# Patient Record
Sex: FEMALE | Race: OTHER | Employment: UNEMPLOYED | URBAN - METROPOLITAN AREA
[De-identification: names, ages, dates, MRNs, and addresses within clinical notes are randomized per-mention and may not be internally consistent; named-entity substitution may affect disease eponyms.]

---

## 2018-11-19 ENCOUNTER — HOSPITAL ENCOUNTER (EMERGENCY)
Age: 43
Discharge: HOME | End: 2018-11-19
Attending: EMERGENCY MEDICINE
Payer: COMMERCIAL

## 2018-11-19 ENCOUNTER — APPOINTMENT (OUTPATIENT)
Dept: GENERAL RADIOLOGY | Age: 43
End: 2018-11-19
Payer: COMMERCIAL

## 2018-11-19 VITALS
OXYGEN SATURATION: 98 % | BODY MASS INDEX: 28.47 KG/M2 | HEART RATE: 88 BPM | HEIGHT: 60 IN | WEIGHT: 145 LBS | RESPIRATION RATE: 14 BRPM | SYSTOLIC BLOOD PRESSURE: 126 MMHG | TEMPERATURE: 98 F | DIASTOLIC BLOOD PRESSURE: 64 MMHG

## 2018-11-19 DIAGNOSIS — J18.9 PNEUMONIA OF RIGHT LOWER LOBE DUE TO INFECTIOUS ORGANISM: Primary | ICD-10-CM

## 2018-11-19 LAB
ANION GAP SERPL CALCULATED.3IONS-SCNC: 12 MMOL/L (ref 7–16)
BUN BLDV-MCNC: 11 MG/DL (ref 6–20)
CALCIUM SERPL-MCNC: 8.6 MG/DL (ref 8.6–10.2)
CHLORIDE BLD-SCNC: 103 MMOL/L (ref 98–107)
CO2: 22 MMOL/L (ref 22–29)
CREAT SERPL-MCNC: 0.6 MG/DL (ref 0.5–1)
D DIMER: 200 NG/ML DDU
EKG ATRIAL RATE: 92 BPM
EKG P AXIS: 72 DEGREES
EKG P-R INTERVAL: 146 MS
EKG Q-T INTERVAL: 380 MS
EKG QRS DURATION: 74 MS
EKG QTC CALCULATION (BAZETT): 469 MS
EKG R AXIS: 58 DEGREES
EKG T AXIS: 50 DEGREES
EKG VENTRICULAR RATE: 92 BPM
GFR AFRICAN AMERICAN: >60
GFR NON-AFRICAN AMERICAN: >60 ML/MIN/1.73
GLUCOSE BLD-MCNC: 91 MG/DL (ref 74–99)
HCT VFR BLD CALC: 38.6 % (ref 34–48)
HEMOGLOBIN: 12.3 G/DL (ref 11.5–15.5)
MCH RBC QN AUTO: 29.5 PG (ref 26–35)
MCHC RBC AUTO-ENTMCNC: 31.9 % (ref 32–34.5)
MCV RBC AUTO: 92.6 FL (ref 80–99.9)
PDW BLD-RTO: 13.3 FL (ref 11.5–15)
PLATELET # BLD: 360 E9/L (ref 130–450)
PMV BLD AUTO: 10.5 FL (ref 7–12)
POTASSIUM SERPL-SCNC: 5.2 MMOL/L (ref 3.5–5)
RBC # BLD: 4.17 E12/L (ref 3.5–5.5)
SODIUM BLD-SCNC: 137 MMOL/L (ref 132–146)
TROPONIN: <0.01 NG/ML (ref 0–0.03)
WBC # BLD: 10.1 E9/L (ref 4.5–11.5)

## 2018-11-19 PROCEDURE — 85378 FIBRIN DEGRADE SEMIQUANT: CPT

## 2018-11-19 PROCEDURE — 6370000000 HC RX 637 (ALT 250 FOR IP): Performed by: EMERGENCY MEDICINE

## 2018-11-19 PROCEDURE — 6370000000 HC RX 637 (ALT 250 FOR IP): Performed by: PHYSICIAN ASSISTANT

## 2018-11-19 PROCEDURE — 93005 ELECTROCARDIOGRAM TRACING: CPT

## 2018-11-19 PROCEDURE — 71046 X-RAY EXAM CHEST 2 VIEWS: CPT

## 2018-11-19 PROCEDURE — 99285 EMERGENCY DEPT VISIT HI MDM: CPT

## 2018-11-19 PROCEDURE — 85027 COMPLETE CBC AUTOMATED: CPT

## 2018-11-19 PROCEDURE — 80048 BASIC METABOLIC PNL TOTAL CA: CPT

## 2018-11-19 PROCEDURE — 84484 ASSAY OF TROPONIN QUANT: CPT

## 2018-11-19 PROCEDURE — 94640 AIRWAY INHALATION TREATMENT: CPT

## 2018-11-19 PROCEDURE — 94664 DEMO&/EVAL PT USE INHALER: CPT

## 2018-11-19 RX ORDER — IPRATROPIUM BROMIDE AND ALBUTEROL SULFATE 2.5; .5 MG/3ML; MG/3ML
1 SOLUTION RESPIRATORY (INHALATION)
Status: COMPLETED | OUTPATIENT
Start: 2018-11-19 | End: 2018-11-19

## 2018-11-19 RX ORDER — SODIUM CHLORIDE 0.9 % (FLUSH) 0.9 %
SYRINGE (ML) INJECTION
Status: DISCONTINUED
Start: 2018-11-19 | End: 2018-11-19 | Stop reason: HOSPADM

## 2018-11-19 RX ORDER — DOXYCYCLINE HYCLATE 100 MG/1
100 CAPSULE ORAL ONCE
Status: COMPLETED | OUTPATIENT
Start: 2018-11-19 | End: 2018-11-19

## 2018-11-19 RX ORDER — DOXYCYCLINE HYCLATE 100 MG
100 TABLET ORAL 2 TIMES DAILY
Qty: 14 TABLET | Refills: 0 | Status: SHIPPED | OUTPATIENT
Start: 2018-11-19 | End: 2018-11-26

## 2018-11-19 RX ORDER — CEFDINIR 300 MG/1
300 CAPSULE ORAL ONCE
Status: COMPLETED | OUTPATIENT
Start: 2018-11-19 | End: 2018-11-19

## 2018-11-19 RX ORDER — 0.9 % SODIUM CHLORIDE 0.9 %
10 VIAL (ML) INJECTION PRN
Status: DISCONTINUED | OUTPATIENT
Start: 2018-11-19 | End: 2018-11-19 | Stop reason: HOSPADM

## 2018-11-19 RX ORDER — CEFDINIR 300 MG/1
300 CAPSULE ORAL 2 TIMES DAILY
Qty: 14 CAPSULE | Refills: 0 | Status: SHIPPED | OUTPATIENT
Start: 2018-11-19 | End: 2018-11-26

## 2018-11-19 RX ADMIN — CEFDINIR 300 MG: 300 CAPSULE ORAL at 16:33

## 2018-11-19 RX ADMIN — IPRATROPIUM BROMIDE AND ALBUTEROL SULFATE 1 AMPULE: .5; 3 SOLUTION RESPIRATORY (INHALATION) at 12:53

## 2018-11-19 RX ADMIN — IPRATROPIUM BROMIDE AND ALBUTEROL SULFATE 1 AMPULE: .5; 3 SOLUTION RESPIRATORY (INHALATION) at 12:54

## 2018-11-19 RX ADMIN — DOXYCYCLINE HYCLATE 100 MG: 100 CAPSULE ORAL at 16:33

## 2018-11-19 NOTE — ED PROVIDER NOTES
380 ms    QTc Calculation (Bazett) 469 ms    P Axis 72 degrees    R Axis 58 degrees    T Axis 50 degrees       RADIOLOGY:  Interpreted by Radiologist.  XR CHEST STANDARD (2 VW)   Final Result   Nonspecific right basilar airspace disease could be   reflective of atelectasis or pneumonia, clinical correlation   recommended. EKG:  This EKG is signed and interpreted by the EP. Time: 1526  Rate: 92  Rhythm: normal sinus   Interpretation: no acute changes  Comparison: no previous EKG available    ------------------------- NURSING NOTES AND VITALS REVIEWED ---------------------------   The nursing notes within the ED encounter and vital signs as below have been reviewed by myself. /64   Pulse 88   Temp 98 °F (36.7 °C) (Oral)   Resp 14   Ht 5' (1.524 m)   Wt 145 lb (65.8 kg)   SpO2 98%   BMI 28.32 kg/m²   Oxygen Saturation Interpretation: Normal    The patients available past medical records and past encounters were reviewed. ------------------------------ ED COURSE/MEDICAL DECISION MAKING----------------------  Medications   sodium chloride (PF) 0.9 % injection 10 mL (not administered)   sodium chloride flush 0.9 % injection (not administered)   sodium chloride flush 0.9 % injection (not administered)   ipratropium-albuterol (DUONEB) nebulizer solution 1 ampule (1 ampule Inhalation Given 11/19/18 1254)   doxycycline hyclate (VIBRAMYCIN) capsule 100 mg (100 mg Oral Given 11/19/18 1633)   cefdinir (OMNICEF) capsule 300 mg (300 mg Oral Given 11/19/18 1633)       Medical Decision Making:    Patient presents with productive cough and URI symptoms. She was well-appearing on exam. Lungs were clear. No respiratory distress. Patient has no PE/DVT risk factors and a negative d-dimer. I do not suspect PE at this time. EKG showed NSR without ST or T wave changes. CXR concerning for pneumonia. Patient will be treated with doxycycline and omncief due to failure on Z-pack.  PCP referral and ED return

## 2018-11-30 ENCOUNTER — HOSPITAL ENCOUNTER (INPATIENT)
Age: 43
LOS: 2 days | Discharge: HOME | DRG: 145 | End: 2018-12-04
Attending: INTERNAL MEDICINE | Admitting: INTERNAL MEDICINE
Payer: COMMERCIAL

## 2018-11-30 ENCOUNTER — APPOINTMENT (OUTPATIENT)
Dept: GENERAL RADIOLOGY | Age: 43
DRG: 145 | End: 2018-11-30
Payer: COMMERCIAL

## 2018-11-30 DIAGNOSIS — J06.9 ACUTE UPPER RESPIRATORY INFECTION: ICD-10-CM

## 2018-11-30 DIAGNOSIS — J20.9 BRONCHITIS WITH BRONCHOSPASM: Primary | ICD-10-CM

## 2018-11-30 DIAGNOSIS — R06.00 DYSPNEA, UNSPECIFIED TYPE: ICD-10-CM

## 2018-11-30 LAB
ALBUMIN SERPL-MCNC: 4.1 G/DL (ref 3.5–5.2)
ALP BLD-CCNC: 84 U/L (ref 35–104)
ALT SERPL-CCNC: 15 U/L (ref 0–32)
ANION GAP SERPL CALCULATED.3IONS-SCNC: 11 MMOL/L (ref 7–16)
AST SERPL-CCNC: 13 U/L (ref 0–31)
B.E.: -3.7 MMOL/L (ref -3–3)
BASOPHILS ABSOLUTE: 0.03 E9/L (ref 0–0.2)
BASOPHILS RELATIVE PERCENT: 0.3 % (ref 0–2)
BILIRUB SERPL-MCNC: 0.3 MG/DL (ref 0–1.2)
BUN BLDV-MCNC: 8 MG/DL (ref 6–20)
CALCIUM SERPL-MCNC: 8.6 MG/DL (ref 8.6–10.2)
CHLORIDE BLD-SCNC: 106 MMOL/L (ref 98–107)
CO2: 24 MMOL/L (ref 22–29)
COHB: 0.6 % (ref 0–1.5)
CREAT SERPL-MCNC: 0.5 MG/DL (ref 0.5–1)
CRITICAL: ABNORMAL
DATE ANALYZED: ABNORMAL
DATE OF COLLECTION: ABNORMAL
EKG ATRIAL RATE: 87 BPM
EKG P AXIS: 42 DEGREES
EKG P-R INTERVAL: 146 MS
EKG Q-T INTERVAL: 370 MS
EKG QRS DURATION: 70 MS
EKG QTC CALCULATION (BAZETT): 445 MS
EKG R AXIS: 33 DEGREES
EKG T AXIS: 20 DEGREES
EKG VENTRICULAR RATE: 87 BPM
EOSINOPHILS ABSOLUTE: 0.52 E9/L (ref 0.05–0.5)
EOSINOPHILS RELATIVE PERCENT: 5 % (ref 0–6)
GFR AFRICAN AMERICAN: >60
GFR NON-AFRICAN AMERICAN: >60 ML/MIN/1.73
GLUCOSE BLD-MCNC: 91 MG/DL (ref 74–99)
HCO3: 20 MMOL/L (ref 22–26)
HCT VFR BLD CALC: 35.4 % (ref 34–48)
HEMOGLOBIN: 11.6 G/DL (ref 11.5–15.5)
HHB: 3.3 % (ref 0–5)
IMMATURE GRANULOCYTES #: 0.03 E9/L
IMMATURE GRANULOCYTES %: 0.3 % (ref 0–5)
LAB: ABNORMAL
LACTIC ACID: 1 MMOL/L (ref 0.5–2.2)
LYMPHOCYTES ABSOLUTE: 2.12 E9/L (ref 1.5–4)
LYMPHOCYTES RELATIVE PERCENT: 20.3 % (ref 20–42)
Lab: ABNORMAL
MCH RBC QN AUTO: 30 PG (ref 26–35)
MCHC RBC AUTO-ENTMCNC: 32.8 % (ref 32–34.5)
MCV RBC AUTO: 91.5 FL (ref 80–99.9)
METHB: 0.3 % (ref 0–1.5)
MODE: ABNORMAL
MONOCYTES ABSOLUTE: 0.7 E9/L (ref 0.1–0.95)
MONOCYTES RELATIVE PERCENT: 6.7 % (ref 2–12)
NEUTROPHILS ABSOLUTE: 7.06 E9/L (ref 1.8–7.3)
NEUTROPHILS RELATIVE PERCENT: 67.4 % (ref 43–80)
O2 CONTENT: 16.4 ML/DL
O2 SATURATION: 96.7 % (ref 92–98.5)
O2HB: 95.8 % (ref 94–97)
OPERATOR ID: ABNORMAL
PATIENT TEMP: 37 C
PCO2: 31.7 MMHG (ref 35–45)
PDW BLD-RTO: 13.2 FL (ref 11.5–15)
PH BLOOD GAS: 7.42 (ref 7.35–7.45)
PLATELET # BLD: 326 E9/L (ref 130–450)
PMV BLD AUTO: 10.3 FL (ref 7–12)
PO2: 87.3 MMHG (ref 60–100)
POTASSIUM SERPL-SCNC: 3.9 MMOL/L (ref 3.5–5)
RBC # BLD: 3.87 E12/L (ref 3.5–5.5)
SODIUM BLD-SCNC: 141 MMOL/L (ref 132–146)
SOURCE, BLOOD GAS: ABNORMAL
THB: 12.1 G/DL (ref 11.5–16.5)
TIME ANALYZED: 1604
TOTAL PROTEIN: 7 G/DL (ref 6.4–8.3)
WBC # BLD: 10.5 E9/L (ref 4.5–11.5)

## 2018-11-30 PROCEDURE — 93005 ELECTROCARDIOGRAM TRACING: CPT | Performed by: PHYSICIAN ASSISTANT

## 2018-11-30 PROCEDURE — 6360000002 HC RX W HCPCS: Performed by: INTERNAL MEDICINE

## 2018-11-30 PROCEDURE — 99285 EMERGENCY DEPT VISIT HI MDM: CPT

## 2018-11-30 PROCEDURE — 85025 COMPLETE CBC W/AUTO DIFF WBC: CPT

## 2018-11-30 PROCEDURE — 2580000003 HC RX 258: Performed by: INTERNAL MEDICINE

## 2018-11-30 PROCEDURE — 6360000002 HC RX W HCPCS: Performed by: PHYSICIAN ASSISTANT

## 2018-11-30 PROCEDURE — 83605 ASSAY OF LACTIC ACID: CPT

## 2018-11-30 PROCEDURE — 94664 DEMO&/EVAL PT USE INHALER: CPT

## 2018-11-30 PROCEDURE — 96376 TX/PRO/DX INJ SAME DRUG ADON: CPT

## 2018-11-30 PROCEDURE — G0378 HOSPITAL OBSERVATION PER HR: HCPCS

## 2018-11-30 PROCEDURE — 96374 THER/PROPH/DIAG INJ IV PUSH: CPT

## 2018-11-30 PROCEDURE — 80053 COMPREHEN METABOLIC PANEL: CPT

## 2018-11-30 PROCEDURE — 94761 N-INVAS EAR/PLS OXIMETRY MLT: CPT

## 2018-11-30 PROCEDURE — 94640 AIRWAY INHALATION TREATMENT: CPT

## 2018-11-30 PROCEDURE — 87040 BLOOD CULTURE FOR BACTERIA: CPT

## 2018-11-30 PROCEDURE — 82805 BLOOD GASES W/O2 SATURATION: CPT

## 2018-11-30 PROCEDURE — 6370000000 HC RX 637 (ALT 250 FOR IP): Performed by: PHYSICIAN ASSISTANT

## 2018-11-30 PROCEDURE — 2580000003 HC RX 258: Performed by: PHYSICIAN ASSISTANT

## 2018-11-30 PROCEDURE — 71046 X-RAY EXAM CHEST 2 VIEWS: CPT

## 2018-11-30 RX ORDER — METHYLPREDNISOLONE SODIUM SUCCINATE 125 MG/2ML
60 INJECTION, POWDER, LYOPHILIZED, FOR SOLUTION INTRAMUSCULAR; INTRAVENOUS EVERY 6 HOURS
Status: DISCONTINUED | OUTPATIENT
Start: 2018-11-30 | End: 2018-12-01

## 2018-11-30 RX ORDER — 0.9 % SODIUM CHLORIDE 0.9 %
1000 INTRAVENOUS SOLUTION INTRAVENOUS ONCE
Status: COMPLETED | OUTPATIENT
Start: 2018-11-30 | End: 2018-11-30

## 2018-11-30 RX ORDER — SODIUM CHLORIDE 0.9 % (FLUSH) 0.9 %
10 SYRINGE (ML) INJECTION EVERY 12 HOURS SCHEDULED
Status: DISCONTINUED | OUTPATIENT
Start: 2018-11-30 | End: 2018-12-04 | Stop reason: HOSPADM

## 2018-11-30 RX ORDER — IPRATROPIUM BROMIDE AND ALBUTEROL SULFATE 2.5; .5 MG/3ML; MG/3ML
3 SOLUTION RESPIRATORY (INHALATION) ONCE
Status: COMPLETED | OUTPATIENT
Start: 2018-11-30 | End: 2018-11-30

## 2018-11-30 RX ORDER — ONDANSETRON 2 MG/ML
4 INJECTION INTRAMUSCULAR; INTRAVENOUS EVERY 6 HOURS PRN
Status: DISCONTINUED | OUTPATIENT
Start: 2018-11-30 | End: 2018-12-04 | Stop reason: HOSPADM

## 2018-11-30 RX ORDER — METHYLPREDNISOLONE SODIUM SUCCINATE 125 MG/2ML
125 INJECTION, POWDER, LYOPHILIZED, FOR SOLUTION INTRAMUSCULAR; INTRAVENOUS ONCE
Status: COMPLETED | OUTPATIENT
Start: 2018-11-30 | End: 2018-11-30

## 2018-11-30 RX ORDER — SODIUM CHLORIDE 0.9 % (FLUSH) 0.9 %
10 SYRINGE (ML) INJECTION PRN
Status: DISCONTINUED | OUTPATIENT
Start: 2018-11-30 | End: 2018-12-04 | Stop reason: HOSPADM

## 2018-11-30 RX ORDER — ALBUTEROL SULFATE 2.5 MG/3ML
2.5 SOLUTION RESPIRATORY (INHALATION)
Status: DISCONTINUED | OUTPATIENT
Start: 2018-11-30 | End: 2018-12-04 | Stop reason: HOSPADM

## 2018-11-30 RX ADMIN — IPRATROPIUM BROMIDE AND ALBUTEROL SULFATE 3 AMPULE: .5; 3 SOLUTION RESPIRATORY (INHALATION) at 14:09

## 2018-11-30 RX ADMIN — ALBUTEROL SULFATE 2.5 MG: 2.5 SOLUTION RESPIRATORY (INHALATION) at 21:16

## 2018-11-30 RX ADMIN — SODIUM CHLORIDE 1000 ML: 9 INJECTION, SOLUTION INTRAVENOUS at 14:00

## 2018-11-30 RX ADMIN — METHYLPREDNISOLONE SODIUM SUCCINATE 125 MG: 125 INJECTION, POWDER, FOR SOLUTION INTRAMUSCULAR; INTRAVENOUS at 14:01

## 2018-11-30 RX ADMIN — METHYLPREDNISOLONE SODIUM SUCCINATE 60 MG: 125 INJECTION, POWDER, LYOPHILIZED, FOR SOLUTION INTRAMUSCULAR; INTRAVENOUS at 20:20

## 2018-11-30 RX ADMIN — Medication 10 ML: at 20:20

## 2018-11-30 ASSESSMENT — PAIN SCALES - GENERAL
PAINLEVEL_OUTOF10: 0
PAINLEVEL_OUTOF10: 0

## 2018-11-30 NOTE — ED PROVIDER NOTES
meningeal signs  Pulmonary: Lungs decreased with bilateral expiratory wheezing noted. No rales, or rhonchi. Not in respiratory distress  Cardiovascular:  Regular rate and rhythm, no murmurs, gallops, or rubs. 2+ distal pulses  Abdomen: Soft, + BS. No distension. Nontender. No palpable rigidity, rebound or guarding  Extremities: Moves all extremities x 4. Warm and well perfused  Skin: warm and dry without rash  Neurologic: GCS 15,  Intact. No focal deficits  Psych: Normal Affect      ------------------------ ED COURSE/MEDICAL DECISION MAKING----------------------  Medications   0.9 % sodium chloride bolus (1,000 mLs Intravenous New Bag 11/30/18 1400)   ipratropium-albuterol (DUONEB) nebulizer solution 3 ampule (3 ampules Inhalation Given 11/30/18 1409)   methylPREDNISolone sodium (SOLU-MEDROL) injection 125 mg (125 mg Intravenous Given 11/30/18 1401)         Medical Decision Making:    Dr. Yolanda Rodriguez has already called him about this patient and would like her admitted pending further workup. We did give her oral DuoNeb treatment and Solu-Medrol. Patient will be seen and evaluated with Dr. Georgina Rasmussen       Counseling: The emergency provider has spoken with the patient and discussed todays results, in addition to providing specific details for the plan of care and counseling regarding the diagnosis and prognosis. Questions are answered at this time and they are agreeable with the plan.      ------------------------ IMPRESSION AND DISPOSITION -------------------------------    IMPRESSION  1. Bronchitis with bronchospasm    2. Dyspnea, unspecified type    3.  Acute upper respiratory infection        DISPOSITION  Disposition: Admit/Observation to [facility]  Patient condition is stable                   Theo Johnson PA-C  11/30/18 8587

## 2018-11-30 NOTE — ED NOTES
Patient was seen here last week and dx with pneumonia. She finished prescriptions of doxycycline and cefdinir this past Monday 11/26. She went into see Dr. Barbara Shah today and was told to come in here. Pt states she does not feel much better. She is still having shortness of breath, cough, and wheezing. Denies fevers or chills.       Félix Hickey RN  11/30/18 0009

## 2018-11-30 NOTE — ED NOTES
Bed:  [place] [address]  Expected date:   Expected time:   Means of arrival:   Comments:  Phoenix Arcos RN  11/30/18 4842

## 2018-12-01 ENCOUNTER — APPOINTMENT (OUTPATIENT)
Dept: CT IMAGING | Age: 43
DRG: 145 | End: 2018-12-01
Payer: COMMERCIAL

## 2018-12-01 LAB
BASOPHILS ABSOLUTE: 0.01 E9/L (ref 0–0.2)
BASOPHILS RELATIVE PERCENT: 0.1 % (ref 0–2)
D DIMER: 346 NG/ML DDU
EOSINOPHILS ABSOLUTE: 0 E9/L (ref 0.05–0.5)
EOSINOPHILS RELATIVE PERCENT: 0 % (ref 0–6)
FILM ARRAY ADENOVIRUS: NORMAL
FILM ARRAY BORDETELLA PERTUSSIS: NORMAL
FILM ARRAY CHLAMYDOPHILIA PNEUMONIAE: NORMAL
FILM ARRAY CORONAVIRUS 229E: NORMAL
FILM ARRAY CORONAVIRUS HKU1: NORMAL
FILM ARRAY CORONAVIRUS NL63: NORMAL
FILM ARRAY CORONAVIRUS OC43: NORMAL
FILM ARRAY INFLUENZA A VIRUS 09H1: NORMAL
FILM ARRAY INFLUENZA A VIRUS H1: NORMAL
FILM ARRAY INFLUENZA A VIRUS H3: NORMAL
FILM ARRAY INFLUENZA A VIRUS: NORMAL
FILM ARRAY INFLUENZA B: NORMAL
FILM ARRAY METAPNEUMOVIRUS: NORMAL
FILM ARRAY MYCOPLASMA PNEUMONIAE: NORMAL
FILM ARRAY PARAINFLUENZA VIRUS 1: NORMAL
FILM ARRAY PARAINFLUENZA VIRUS 2: NORMAL
FILM ARRAY PARAINFLUENZA VIRUS 3: NORMAL
FILM ARRAY PARAINFLUENZA VIRUS 4: NORMAL
FILM ARRAY RESPIRATORY SYNCITIAL VIRUS: NORMAL
FILM ARRAY RHINOVIRUS/ENTEROVIRUS: NORMAL
HCG QUALITATIVE: NEGATIVE
HCT VFR BLD CALC: 35.5 % (ref 34–48)
HEMOGLOBIN: 11.7 G/DL (ref 11.5–15.5)
IMMATURE GRANULOCYTES #: 0.04 E9/L
IMMATURE GRANULOCYTES %: 0.4 % (ref 0–5)
LV EF: 65 %
LVEF MODALITY: NORMAL
LYMPHOCYTES ABSOLUTE: 1.02 E9/L (ref 1.5–4)
LYMPHOCYTES RELATIVE PERCENT: 10.1 % (ref 20–42)
MCH RBC QN AUTO: 29.8 PG (ref 26–35)
MCHC RBC AUTO-ENTMCNC: 33 % (ref 32–34.5)
MCV RBC AUTO: 90.6 FL (ref 80–99.9)
MONOCYTES ABSOLUTE: 0.03 E9/L (ref 0.1–0.95)
MONOCYTES RELATIVE PERCENT: 0.3 % (ref 2–12)
NEUTROPHILS ABSOLUTE: 8.96 E9/L (ref 1.8–7.3)
NEUTROPHILS RELATIVE PERCENT: 89.1 % (ref 43–80)
PDW BLD-RTO: 13.1 FL (ref 11.5–15)
PLATELET # BLD: 349 E9/L (ref 130–450)
PMV BLD AUTO: 10.2 FL (ref 7–12)
PROCALCITONIN: 0.02 NG/ML (ref 0–0.08)
RBC # BLD: 3.92 E12/L (ref 3.5–5.5)
T3 TOTAL: 87.3 NG/DL (ref 80–200)
T4 TOTAL: 7.4 MCG/DL (ref 4.5–11.7)
TSH SERPL DL<=0.05 MIU/L-ACNC: 0.76 UIU/ML (ref 0.27–4.2)
WBC # BLD: 10.1 E9/L (ref 4.5–11.5)

## 2018-12-01 PROCEDURE — 71275 CT ANGIOGRAPHY CHEST: CPT

## 2018-12-01 PROCEDURE — 36415 COLL VENOUS BLD VENIPUNCTURE: CPT

## 2018-12-01 PROCEDURE — 87798 DETECT AGENT NOS DNA AMP: CPT

## 2018-12-01 PROCEDURE — 84443 ASSAY THYROID STIM HORMONE: CPT

## 2018-12-01 PROCEDURE — 96372 THER/PROPH/DIAG INJ SC/IM: CPT

## 2018-12-01 PROCEDURE — 2580000003 HC RX 258

## 2018-12-01 PROCEDURE — 96376 TX/PRO/DX INJ SAME DRUG ADON: CPT

## 2018-12-01 PROCEDURE — 84703 CHORIONIC GONADOTROPIN ASSAY: CPT

## 2018-12-01 PROCEDURE — 87486 CHLMYD PNEUM DNA AMP PROBE: CPT

## 2018-12-01 PROCEDURE — 94640 AIRWAY INHALATION TREATMENT: CPT

## 2018-12-01 PROCEDURE — 85378 FIBRIN DEGRADE SEMIQUANT: CPT

## 2018-12-01 PROCEDURE — 6360000002 HC RX W HCPCS: Performed by: INTERNAL MEDICINE

## 2018-12-01 PROCEDURE — 94760 N-INVAS EAR/PLS OXIMETRY 1: CPT

## 2018-12-01 PROCEDURE — 93306 TTE W/DOPPLER COMPLETE: CPT

## 2018-12-01 PROCEDURE — 84480 ASSAY TRIIODOTHYRONINE (T3): CPT

## 2018-12-01 PROCEDURE — 87450 HC DIRECT STREP B ANTIGEN: CPT

## 2018-12-01 PROCEDURE — 85025 COMPLETE CBC W/AUTO DIFF WBC: CPT

## 2018-12-01 PROCEDURE — 2580000003 HC RX 258: Performed by: INTERNAL MEDICINE

## 2018-12-01 PROCEDURE — 87633 RESP VIRUS 12-25 TARGETS: CPT

## 2018-12-01 PROCEDURE — 84145 PROCALCITONIN (PCT): CPT

## 2018-12-01 PROCEDURE — 87581 M.PNEUMON DNA AMP PROBE: CPT

## 2018-12-01 PROCEDURE — 6360000004 HC RX CONTRAST MEDICATION: Performed by: RADIOLOGY

## 2018-12-01 PROCEDURE — G0378 HOSPITAL OBSERVATION PER HR: HCPCS

## 2018-12-01 PROCEDURE — 84436 ASSAY OF TOTAL THYROXINE: CPT

## 2018-12-01 RX ORDER — METHYLPREDNISOLONE SODIUM SUCCINATE 40 MG/ML
40 INJECTION, POWDER, LYOPHILIZED, FOR SOLUTION INTRAMUSCULAR; INTRAVENOUS EVERY 8 HOURS
Status: DISCONTINUED | OUTPATIENT
Start: 2018-12-01 | End: 2018-12-02

## 2018-12-01 RX ADMIN — Medication 10 ML: at 21:18

## 2018-12-01 RX ADMIN — ENOXAPARIN SODIUM 40 MG: 40 INJECTION SUBCUTANEOUS at 08:47

## 2018-12-01 RX ADMIN — ALBUTEROL SULFATE 2.5 MG: 2.5 SOLUTION RESPIRATORY (INHALATION) at 16:23

## 2018-12-01 RX ADMIN — Medication 10 ML: at 08:51

## 2018-12-01 RX ADMIN — Medication 10 ML: at 03:00

## 2018-12-01 RX ADMIN — ALBUTEROL SULFATE 2.5 MG: 2.5 SOLUTION RESPIRATORY (INHALATION) at 20:24

## 2018-12-01 RX ADMIN — WATER 2 ML: 1 INJECTION INTRAMUSCULAR; INTRAVENOUS; SUBCUTANEOUS at 08:51

## 2018-12-01 RX ADMIN — ALBUTEROL SULFATE 2.5 MG: 2.5 SOLUTION RESPIRATORY (INHALATION) at 08:18

## 2018-12-01 RX ADMIN — METHYLPREDNISOLONE SODIUM SUCCINATE 60 MG: 125 INJECTION, POWDER, LYOPHILIZED, FOR SOLUTION INTRAMUSCULAR; INTRAVENOUS at 03:00

## 2018-12-01 RX ADMIN — METHYLPREDNISOLONE SODIUM SUCCINATE 40 MG: 40 INJECTION, POWDER, FOR SOLUTION INTRAMUSCULAR; INTRAVENOUS at 17:26

## 2018-12-01 RX ADMIN — ALBUTEROL SULFATE 2.5 MG: 2.5 SOLUTION RESPIRATORY (INHALATION) at 11:56

## 2018-12-01 RX ADMIN — METHYLPREDNISOLONE SODIUM SUCCINATE 60 MG: 125 INJECTION, POWDER, LYOPHILIZED, FOR SOLUTION INTRAMUSCULAR; INTRAVENOUS at 08:50

## 2018-12-01 RX ADMIN — IOPAMIDOL 100 ML: 755 INJECTION, SOLUTION INTRAVENOUS at 12:29

## 2018-12-01 ASSESSMENT — PAIN SCALES - GENERAL
PAINLEVEL_OUTOF10: 0
PAINLEVEL_OUTOF10: 0

## 2018-12-02 PROBLEM — R06.00 DYSPNEA: Status: ACTIVE | Noted: 2018-12-02

## 2018-12-02 LAB
CORTISOL TOTAL: 6.72 MCG/DL (ref 2.68–18.4)
STREP PNEUMONIAE ANTIGEN, URINE: NORMAL

## 2018-12-02 PROCEDURE — 94640 AIRWAY INHALATION TREATMENT: CPT

## 2018-12-02 PROCEDURE — 6360000002 HC RX W HCPCS: Performed by: INTERNAL MEDICINE

## 2018-12-02 PROCEDURE — 94760 N-INVAS EAR/PLS OXIMETRY 1: CPT

## 2018-12-02 PROCEDURE — 96372 THER/PROPH/DIAG INJ SC/IM: CPT

## 2018-12-02 PROCEDURE — 6370000000 HC RX 637 (ALT 250 FOR IP): Performed by: INTERNAL MEDICINE

## 2018-12-02 PROCEDURE — 36415 COLL VENOUS BLD VENIPUNCTURE: CPT

## 2018-12-02 PROCEDURE — 1200000000 HC SEMI PRIVATE

## 2018-12-02 PROCEDURE — 82384 ASSAY THREE CATECHOLAMINES: CPT

## 2018-12-02 PROCEDURE — 96376 TX/PRO/DX INJ SAME DRUG ADON: CPT

## 2018-12-02 PROCEDURE — 82533 TOTAL CORTISOL: CPT

## 2018-12-02 PROCEDURE — 82530 CORTISOL FREE: CPT

## 2018-12-02 PROCEDURE — 2580000003 HC RX 258: Performed by: INTERNAL MEDICINE

## 2018-12-02 RX ORDER — PREDNISONE 1 MG/1
10 TABLET ORAL DAILY
Status: DISCONTINUED | OUTPATIENT
Start: 2018-12-12 | End: 2018-12-04 | Stop reason: HOSPADM

## 2018-12-02 RX ORDER — PREDNISONE 20 MG/1
20 TABLET ORAL DAILY
Status: DISCONTINUED | OUTPATIENT
Start: 2018-12-09 | End: 2018-12-04 | Stop reason: HOSPADM

## 2018-12-02 RX ORDER — PREDNISONE 20 MG/1
40 TABLET ORAL DAILY
Status: DISCONTINUED | OUTPATIENT
Start: 2018-12-03 | End: 2018-12-02

## 2018-12-02 RX ORDER — PREDNISONE 20 MG/1
40 TABLET ORAL DAILY
Status: DISCONTINUED | OUTPATIENT
Start: 2018-12-03 | End: 2018-12-04 | Stop reason: HOSPADM

## 2018-12-02 RX ADMIN — Medication 10 ML: at 20:56

## 2018-12-02 RX ADMIN — ALBUTEROL SULFATE 2.5 MG: 2.5 SOLUTION RESPIRATORY (INHALATION) at 16:00

## 2018-12-02 RX ADMIN — ALBUTEROL SULFATE 2.5 MG: 2.5 SOLUTION RESPIRATORY (INHALATION) at 21:30

## 2018-12-02 RX ADMIN — METHYLPREDNISOLONE SODIUM SUCCINATE 40 MG: 40 INJECTION, POWDER, FOR SOLUTION INTRAMUSCULAR; INTRAVENOUS at 01:59

## 2018-12-02 RX ADMIN — ENOXAPARIN SODIUM 40 MG: 40 INJECTION SUBCUTANEOUS at 08:17

## 2018-12-02 RX ADMIN — ALBUTEROL SULFATE 2.5 MG: 2.5 SOLUTION RESPIRATORY (INHALATION) at 08:25

## 2018-12-02 RX ADMIN — METHYLPREDNISOLONE SODIUM SUCCINATE 40 MG: 40 INJECTION, POWDER, FOR SOLUTION INTRAMUSCULAR; INTRAVENOUS at 08:17

## 2018-12-02 RX ADMIN — MOMETASONE FUROATE AND FORMOTEROL FUMARATE DIHYDRATE 2 PUFF: 200; 5 AEROSOL RESPIRATORY (INHALATION) at 21:36

## 2018-12-02 RX ADMIN — Medication 10 ML: at 08:17

## 2018-12-02 ASSESSMENT — PAIN SCALES - GENERAL
PAINLEVEL_OUTOF10: 0

## 2018-12-02 NOTE — CONSULTS
no masses. Heart:  RRR  Lungs:  No audible wheezing. Respirations symmetric and non-labored. Abdomen:  soft, nontender, no masses, no organomegaly, no peritoneal signs  Extremities:  No clubbing, cyanosis, or edema  Skin:  Warm and dry, no open lesions or rashes  Neuro:  Cranial nerves 2-12 intact, no focal deficits  Rectal: deferred  Genitalia:  [place] no    Labs:   Recent Labs      11/30/18   1355  12/01/18   0315   WBC  10.5  10.1   RBC  3.87  3.92   HGB  11.6  11.7   HCT  35.4  35.5   MCV  91.5  90.6   MCH  30.0  29.8   MCHC  32.8  33.0   RDW  13.2  13.1   PLT  326  349   MPV  10.3  10.2       Recent Labs      11/30/18   1355   CREATININE  0.5     No central pulmonary embolism or aortic dissection.       3 x 2.3 cm left breast mass.  Further assessment by mammography and   ultrasonography is recommended.       Left adrenal mass.       The above findings were communicated to the ordering physician.       ALERT:  THIS IS AN ABNORMAL REPORT           Images:      Assessment: Ashish Hernández 41 y.o. female     Left adrenal mass  Breast mass    Plan:    CT was reviewed  This looks to be an adrenal myelipoma  Check functional studies  Her  is present  She need a mammogram  She will need outpatient eval  Hold on gu intervention  She may need another CT in 6 months  All options were discussed  Cont the current management      Wolf Modi Memo, DO [facility]  Urology
11/30/18   1355  12/01/18   0315   WBC  10.5  10.1   HGB  11.6  11.7   HCT  35.4  35.5   MCV  91.5  90.6   PLT  326  349       BMP:  Recent Labs      11/30/18   1355   NA  141   K  3.9   CL  106   CO2  24   BUN  8   CREATININE  0.5    ALB:3,BILIDIR:3,BILITOT:3,ALKPHOS:3)@    PT/INR: No results for input(s): PROTIME, INR in the last 72 hours. Cultures:  Sputum: not available  Blood: not available    ABG:   Recent Labs      11/30/18   1604   PH  7.417   PO2  87.3   PCO2  31.7*   HCO3  20.0*   BE  -3.7*   O2SAT  96.7   METHB  0.3   O2HB  95.8   COHB  0.6   O2CON  16.4   HHB  3.3   THB  12.1             Films:     XR CHEST STANDARD (2 VW)   Final Result   1. No acute pleuroparenchymal disease. CTA PULMONARY W CONTRAST    (Results Pending)   . Assessment:  1. Small right lower lobe infiltrate: Possibly community acquired pneumonia. This would've been after what sounds like an ongoing viral illness with bronchospasm. Plan:  1. Empiric antibiotics  2. Check infection screens  3. A CT scan of the chest is been ordered, this will be reviewed prior to discharge. Thanks for letting us see this patient in consultation. Please contact us with any questions. Office [phone] or after hours through [employer], x [phone]. Please note that voice recognition technology was used in the preparation of this note and make therefore it may contain inadvertent transcription errors    Keri Méndez M.D., F.C.C.P.     [facility], [address] [place] [address], [address] [place] [address]

## 2018-12-02 NOTE — PROGRESS NOTES
Report given to [facility] RN. No voiced concerns.

## 2018-12-03 PROCEDURE — 2580000003 HC RX 258: Performed by: INTERNAL MEDICINE

## 2018-12-03 PROCEDURE — 6360000002 HC RX W HCPCS: Performed by: INTERNAL MEDICINE

## 2018-12-03 PROCEDURE — 6370000000 HC RX 637 (ALT 250 FOR IP): Performed by: INTERNAL MEDICINE

## 2018-12-03 PROCEDURE — 1200000000 HC SEMI PRIVATE

## 2018-12-03 PROCEDURE — 94640 AIRWAY INHALATION TREATMENT: CPT

## 2018-12-03 RX ADMIN — ALBUTEROL SULFATE 2.5 MG: 2.5 SOLUTION RESPIRATORY (INHALATION) at 08:31

## 2018-12-03 RX ADMIN — ALBUTEROL SULFATE 2.5 MG: 2.5 SOLUTION RESPIRATORY (INHALATION) at 12:05

## 2018-12-03 RX ADMIN — PREDNISONE 40 MG: 20 TABLET ORAL at 09:15

## 2018-12-03 RX ADMIN — Medication 10 ML: at 09:16

## 2018-12-03 RX ADMIN — ALBUTEROL SULFATE 2.5 MG: 2.5 SOLUTION RESPIRATORY (INHALATION) at 20:41

## 2018-12-03 RX ADMIN — MOMETASONE FUROATE AND FORMOTEROL FUMARATE DIHYDRATE 2 PUFF: 200; 5 AEROSOL RESPIRATORY (INHALATION) at 08:31

## 2018-12-03 RX ADMIN — Medication 10 ML: at 20:32

## 2018-12-03 RX ADMIN — ALBUTEROL SULFATE 2.5 MG: 2.5 SOLUTION RESPIRATORY (INHALATION) at 16:16

## 2018-12-03 RX ADMIN — ENOXAPARIN SODIUM 40 MG: 40 INJECTION SUBCUTANEOUS at 09:15

## 2018-12-03 RX ADMIN — MOMETASONE FUROATE AND FORMOTEROL FUMARATE DIHYDRATE 2 PUFF: 200; 5 AEROSOL RESPIRATORY (INHALATION) at 20:41

## 2018-12-03 ASSESSMENT — PAIN SCALES - GENERAL
PAINLEVEL_OUTOF10: 0
PAINLEVEL_OUTOF10: 0

## 2018-12-03 NOTE — PROGRESS NOTES
Pulmonary Progress Note    Admit Date: 2018  Hospital day                               PCP: Tonny Ramirez III,     Chief Complaint (s):  Patient Active Problem List   Diagnosis    Bronchitis with bronchospasm    Dyspnea       Subjective:  · Cough has resolved, the patient is tolerating metered-dose inhaler. Vitals:  VITALS:  BP (!) 119/57   Pulse 78   Temp 98.2 °F (36.8 °C)   Resp 16   Ht 5' (1.524 m)   Wt 145 lb (65.8 kg)   SpO2 99%   BMI 28.32 kg/m²     24HR INTAKE/OUTPUT:      Intake/Output Summary (Last 24 hours) at 18 0856  Last data filed at 18 2774   Gross per 24 hour   Intake              580 ml   Output                0 ml   Net              580 ml       24HR PULSE OXIMETRY RANGE:    SpO2  Av.4 %  Min: 97 %  Max: 100 %    Medications:  IV:      Scheduled Meds:   mometasone-formoterol  2 puff Inhalation BID    predniSONE  40 mg Oral Daily    Followed by   Juan Dozier ON 2018] predniSONE  30 mg Oral Daily    Followed by   Juan Dozier ON 2018] predniSONE  20 mg Oral Daily    Followed by   Juan Dozier ON 2018] predniSONE  10 mg Oral Daily    sodium chloride flush  10 mL Intravenous 2 times per day    enoxaparin  40 mg Subcutaneous Daily    albuterol  2.5 mg Nebulization Q4H WA       Diet:   DIET GENERAL;     EXAM:  General: No distress. Alert. Eyes: PERRL. No sclera icterus. No conjunctival injection. ENT: No discharge. Pharynx clear. Neck: Trachea midline. Normal thyroid. Resp: No accessory muscle use. No rales. No wheezing. No rhonchi. CV: Regular rate. Regular rhythm. No murmur or rub. Abd: Non-tender. Non-distended. No masses. No organomegaly. Normal bowel sounds. Skin: Warm and dry. No nodule on exposed extremities. No rash on exposed extremities. Ext: No cyanosis, clubbing, edema  Lymph: No cervical LAD. No supraclavicular LAD. M/S: No cyanosis. No joint deformity. No clubbing. Neuro: Awake. Follows commands.  [facility], [address], [address] [place] [address]

## 2018-12-04 VITALS
TEMPERATURE: 98 F | HEIGHT: 60 IN | WEIGHT: 145 LBS | RESPIRATION RATE: 16 BRPM | DIASTOLIC BLOOD PRESSURE: 61 MMHG | HEART RATE: 108 BPM | OXYGEN SATURATION: 98 % | SYSTOLIC BLOOD PRESSURE: 124 MMHG | BODY MASS INDEX: 28.47 KG/M2

## 2018-12-04 PROCEDURE — 6360000002 HC RX W HCPCS: Performed by: INTERNAL MEDICINE

## 2018-12-04 PROCEDURE — 2580000003 HC RX 258: Performed by: INTERNAL MEDICINE

## 2018-12-04 PROCEDURE — 6370000000 HC RX 637 (ALT 250 FOR IP): Performed by: INTERNAL MEDICINE

## 2018-12-04 PROCEDURE — 94640 AIRWAY INHALATION TREATMENT: CPT

## 2018-12-04 RX ORDER — PREDNISONE 20 MG/1
40 TABLET ORAL DAILY
Qty: 20 TABLET | Refills: 0 | Status: SHIPPED | OUTPATIENT
Start: 2018-12-05 | End: 2018-12-15

## 2018-12-04 RX ORDER — PREDNISONE 20 MG/1
20 TABLET ORAL DAILY
Qty: 10 TABLET | Refills: 0 | Status: SHIPPED | OUTPATIENT
Start: 2018-12-09 | End: 2018-12-19

## 2018-12-04 RX ORDER — PREDNISONE 10 MG/1
10 TABLET ORAL DAILY
Qty: 10 TABLET | Refills: 0 | Status: SHIPPED | OUTPATIENT
Start: 2018-12-12 | End: 2018-12-22

## 2018-12-04 RX ORDER — ALBUTEROL SULFATE 2.5 MG/3ML
2.5 SOLUTION RESPIRATORY (INHALATION)
Qty: 120 EACH | Refills: 3 | Status: SHIPPED | OUTPATIENT
Start: 2018-12-04 | End: 2019-06-04

## 2018-12-04 RX ORDER — PREDNISONE 10 MG/1
30 TABLET ORAL DAILY
Qty: 30 TABLET | Refills: 0 | Status: SHIPPED | OUTPATIENT
Start: 2018-12-06 | End: 2018-12-16

## 2018-12-04 RX ADMIN — Medication 10 ML: at 09:27

## 2018-12-04 RX ADMIN — ALBUTEROL SULFATE 2.5 MG: 2.5 SOLUTION RESPIRATORY (INHALATION) at 11:41

## 2018-12-04 RX ADMIN — PREDNISONE 40 MG: 20 TABLET ORAL at 09:27

## 2018-12-04 RX ADMIN — MOMETASONE FUROATE AND FORMOTEROL FUMARATE DIHYDRATE 2 PUFF: 200; 5 AEROSOL RESPIRATORY (INHALATION) at 07:43

## 2018-12-04 RX ADMIN — ALBUTEROL SULFATE 2.5 MG: 2.5 SOLUTION RESPIRATORY (INHALATION) at 16:50

## 2018-12-04 RX ADMIN — ALBUTEROL SULFATE 2.5 MG: 2.5 SOLUTION RESPIRATORY (INHALATION) at 07:43

## 2018-12-04 RX ADMIN — ENOXAPARIN SODIUM 40 MG: 40 INJECTION SUBCUTANEOUS at 09:27

## 2018-12-04 NOTE — PROGRESS NOTES
input(s): NA, K, CL, CO2, PHOS, BUN, CREATININE in the last 72 hours. Invalid input(s): CA  LIVER PROFILE:   No results for input(s): AST, ALT, LIPASE, BILIDIR, BILITOT, ALKPHOS in the last 72 hours. Invalid input(s): AMYLASE,  ALB  PT/INR: No results for input(s): PROTIME, INR in the last 72 hours. APTT: No results for input(s): APTT in the last 72 hours. Pathology:  1. N/A      Microbiology:  1. None    Recent ABG:   No results for input(s): PH, PO2, PCO2, HCO3, BE, O2SAT, METHB, O2HB, COHB, O2CON, HHB, THB in the last 72 hours. Recent Films:  CTA PULMONARY W CONTRAST   Final Result   No central pulmonary embolism or aortic dissection. 3 x 2.3 cm left breast mass. Further assessment by mammography and   ultrasonography is recommended. Left adrenal mass. The above findings were communicated to the ordering physician. ALERT:  THIS IS AN ABNORMAL REPORT            XR CHEST STANDARD (2 VW)   Final Result   1. No acute pleuroparenchymal disease. Assessment:  1. Small right lower lobe infiltrate: not present on CT, this is likely bronchitis with bronchospasm. Relevant screens and cultures are all negative. Procalcitonin precludes the need for antimicrobials. 2. Breast mass  3. Adrenal mass     Plan:  1. Continue inhaler and oral medications. 2. OK to discharge from a pulmonary POV.       Care reviewed with the staff and the patient's family as available. Please note that voice recognition technology was used in the preparation of this note and make therefore it may contain inadvertent transcription errors. Merari Roberts M.D., F.C.C.P.     [facility], [address] [place] [address], [address] [place] [address]

## 2018-12-04 NOTE — DISCHARGE SUMMARY
Exam:    HEENT: NCAT,  PERRLA, No JVD  Heart:  RRR, no murmurs, gallops, or rubs. Lungs:  CTA bilaterally, no wheeze, rales or rhonchi  Abd: bowel sounds present, nontender, nondistended, no masses  Extrem:  No clubbing, cyanosis, or edema    Disposition: home    Patient Instructions:      Medication List      START taking these medications    albuterol (2.5 MG/3ML) 0.083% nebulizer solution  Commonly known as:  PROVENTIL  Take 3 mLs by nebulization every 4 hours (while awake)     mometasone-formoterol 200-5 MCG/ACT inhaler  Commonly known as:  DULERA  Inhale 2 puffs into the lungs 2 times daily     * predniSONE 20 MG tablet  Commonly known as:  DELTASONE  Take 2 tablets by mouth daily for 10 days  Start taking on:  12/5/2018     * predniSONE 10 MG tablet  Commonly known as:  DELTASONE  Take 3 tablets by mouth daily for 10 days  Start taking on:  12/6/2018     * predniSONE 20 MG tablet  Commonly known as:  DELTASONE  Take 1 tablet by mouth daily for 10 days  Start taking on:  12/9/2018     * predniSONE 10 MG tablet  Commonly known as:  DELTASONE  Take 1 tablet by mouth daily for 10 days  Start taking on:  12/12/2018        * This list has 4 medication(s) that are the same as other medications prescribed for you. Read the directions carefully, and ask your doctor or other care provider to review them with you. Where to Get Your Medications      These medications were sent to [facility]/pharmacy #[id number]- [place], [place] - [address] [address]    Phone:  [phone]   · albuterol (2.5 MG/3ML) 0.083% nebulizer solution  · mometasone-formoterol 200-5 MCG/ACT inhaler  · predniSONE 10 MG tablet  · predniSONE 10 MG tablet  · predniSONE 20 MG tablet  · predniSONE 20 MG tablet       Activity: activity as tolerated  Diet: regular diet    Pt has been advised to: Follow-up with Axel Anna III, DO in 1 week.   Follow-up with

## 2018-12-05 LAB
BLOOD CULTURE, ROUTINE: NORMAL
CULTURE, BLOOD 2: NORMAL

## 2018-12-07 LAB
CATE U INT: ABNORMAL
CORTISOL (UR), FREE: NORMAL UG/D
CORTISOL URINE, FREE (/G CRT): 74.79 UG/G CRT
CORTISOL,F,UG/L,U: 35.9 UG/L
CREATININE 24 HOUR URINE: ABNORMAL MG/D (ref 700–1600)
CREATININE URINE: 48 MG/DL
DOPAMINE (G CRT): 419 UG/G CRT (ref 0–250)
DOPAMINE 24 HOUR URINE: ABNORMAL UG/D (ref 77–324)
DOPAMINE, (UG/L): 201 UG/L
EPINEPHRINE (G CRT): <2 UG/G CRT (ref 0–20)
EPINEPHRINE 24 HOUR URINE: ABNORMAL UG/D (ref 1–7)
EPINEPHRINE, (UG/L): <1 UG/L
HOURS COLLECTED: ABNORMAL HR
INTERPRETATION: NORMAL
NOREPINEPH (G CRT): 12 UG/G CRT (ref 0–45)
NOREPINEPHRINE 24 HOUR URINE: ABNORMAL UG/D (ref 16–71)
NOREPINEPHRINE, (UG/L): 6 UG/L
URINE TOTAL VOLUME: ABNORMAL ML

## 2018-12-27 ENCOUNTER — HOSPITAL ENCOUNTER (OUTPATIENT)
Age: 43
Discharge: HOME | End: 2018-12-29

## 2018-12-27 PROCEDURE — 88305 TISSUE EXAM BY PATHOLOGIST: CPT

## 2019-04-25 ENCOUNTER — HOSPITAL ENCOUNTER (OUTPATIENT)
Dept: GENERAL RADIOLOGY | Age: 44
Discharge: HOME | End: 2019-04-27
Payer: COMMERCIAL

## 2019-04-25 ENCOUNTER — HOSPITAL ENCOUNTER (OUTPATIENT)
Age: 44
Discharge: HOME | End: 2019-04-27
Payer: COMMERCIAL

## 2019-04-25 DIAGNOSIS — R05.9 COUGH: ICD-10-CM

## 2019-04-25 PROCEDURE — 71046 X-RAY EXAM CHEST 2 VIEWS: CPT

## 2021-05-14 ENCOUNTER — HOSPITAL ENCOUNTER (OUTPATIENT)
Dept: PULMONOLOGY | Age: 46
Discharge: HOME | End: 2021-05-14
Payer: COMMERCIAL

## 2021-05-14 DIAGNOSIS — R06.00 DYSPNEA, UNSPECIFIED TYPE: ICD-10-CM

## 2021-05-14 PROCEDURE — 94726 PLETHYSMOGRAPHY LUNG VOLUMES: CPT

## 2021-05-14 PROCEDURE — 94060 EVALUATION OF WHEEZING: CPT

## 2021-05-14 PROCEDURE — 94729 DIFFUSING CAPACITY: CPT

## 2021-05-21 ENCOUNTER — HOSPITAL ENCOUNTER (OUTPATIENT)
Age: 46
Discharge: HOME | End: 2021-05-21
Payer: COMMERCIAL

## 2021-05-21 DIAGNOSIS — R06.09 DYSPNEA ON EXERTION: ICD-10-CM

## 2021-05-21 LAB
BASOPHILS ABSOLUTE: 0.02 E9/L (ref 0–0.2)
BASOPHILS RELATIVE PERCENT: 0.3 % (ref 0–2)
EOSINOPHILS ABSOLUTE: 0.11 E9/L (ref 0.05–0.5)
EOSINOPHILS RELATIVE PERCENT: 1.7 % (ref 0–6)
HCT VFR BLD CALC: 38.3 % (ref 34–48)
HEMOGLOBIN: 12 G/DL (ref 11.5–15.5)
IMMATURE GRANULOCYTES #: 0.02 E9/L
IMMATURE GRANULOCYTES %: 0.3 % (ref 0–5)
LYMPHOCYTES ABSOLUTE: 1.4 E9/L (ref 1.5–4)
LYMPHOCYTES RELATIVE PERCENT: 21.2 % (ref 20–42)
MCH RBC QN AUTO: 28.4 PG (ref 26–35)
MCHC RBC AUTO-ENTMCNC: 31.3 % (ref 32–34.5)
MCV RBC AUTO: 90.8 FL (ref 80–99.9)
MONOCYTES ABSOLUTE: 0.58 E9/L (ref 0.1–0.95)
MONOCYTES RELATIVE PERCENT: 8.8 % (ref 2–12)
NEUTROPHILS ABSOLUTE: 4.46 E9/L (ref 1.8–7.3)
NEUTROPHILS RELATIVE PERCENT: 67.7 % (ref 43–80)
PDW BLD-RTO: 13.8 FL (ref 11.5–15)
PLATELET # BLD: 316 E9/L (ref 130–450)
PMV BLD AUTO: 10.9 FL (ref 7–12)
RBC # BLD: 4.22 E12/L (ref 3.5–5.5)
WBC # BLD: 6.6 E9/L (ref 4.5–11.5)

## 2021-05-21 PROCEDURE — 82785 ASSAY OF IGE: CPT

## 2021-05-21 PROCEDURE — 85025 COMPLETE CBC W/AUTO DIFF WBC: CPT

## 2021-05-21 PROCEDURE — 36415 COLL VENOUS BLD VENIPUNCTURE: CPT

## 2021-05-27 LAB — IGE: 42 KU/L

## 2021-06-03 PROBLEM — J45.20 MILD INTERMITTENT ASTHMA WITHOUT COMPLICATION: Status: ACTIVE | Noted: 2021-06-03

## 2022-07-01 ENCOUNTER — HOSPITAL ENCOUNTER (OUTPATIENT)
Age: 47
Discharge: HOME | End: 2022-07-01
Payer: COMMERCIAL

## 2022-07-01 DIAGNOSIS — J45.20 MILD INTERMITTENT ASTHMA WITHOUT COMPLICATION: ICD-10-CM

## 2022-07-01 PROCEDURE — 82785 ASSAY OF IGE: CPT

## 2022-07-01 PROCEDURE — 86003 ALLG SPEC IGE CRUDE XTRC EA: CPT

## 2022-07-07 LAB
2000687N OAK TREE IGE: <0.1 KU/L (ref 0–0.34)
ALLERGEN BARLEY IGE: <0.1 KU/L
ALLERGEN BEEF: <0.1 KU/L
ALLERGEN BERMUDA GRASS IGE: <0.1 KU/L (ref 0–0.34)
ALLERGEN BIRCH IGE: <0.1 KU/L (ref 0–0.34)
ALLERGEN CABBAGE IGE: <0.1 KU/L
ALLERGEN CARROT IGE: <0.1 KU/L
ALLERGEN CHICKEN IGE: <0.1 KU/L
ALLERGEN CODFISH IGE: <0.1 KU/L
ALLERGEN CORN IGE: <0.1 KU/L
ALLERGEN COW MILK IGE: <0.1 KU/L
ALLERGEN CRAB IGE: <0.1 KU/L
ALLERGEN DOG DANDER IGE: <0.1 KU/L (ref 0–0.34)
ALLERGEN EGG WHITE IGE: <0.1 KU/L
ALLERGEN GERMAN COCKROACH IGE: <0.1 KU/L (ref 0–0.34)
ALLERGEN GRAPE IGE: <0.1 KU/L
ALLERGEN HORMODENDRUM IGE: <0.1 KUL/L (ref 0–0.34)
ALLERGEN LETTUCE IGE: <0.1 KU/L
ALLERGEN MOUSE EPITHELIA IGE: <0.1 KU/L (ref 0–0.34)
ALLERGEN NAVY BEAN: <0.1 KU/L
ALLERGEN OAT: <0.1 KU/L
ALLERGEN ORANGE IGE: <0.1 KU/L
ALLERGEN PEANUT (F13) IGE: <0.1 KU/L
ALLERGEN PECAN TREE IGE: <0.1 KU/L (ref 0–0.34)
ALLERGEN PEPPER C. ANNUUM IGE: <0.1 KU/L
ALLERGEN PIGWEED ROUGH IGE: <0.1 KU/L (ref 0–0.34)
ALLERGEN PORK: <0.1 KU/L
ALLERGEN RICE IGE: <0.1 KU/L
ALLERGEN RYE IGE: <0.1 KU/L
ALLERGEN SHEEP SORREL (W18) IGE: <0.1 KU/L (ref 0–0.34)
ALLERGEN SOYBEAN IGE: <0.1 KU/L
ALLERGEN TOMATO IGE: <0.1 KU/L
ALLERGEN TREE SYCAMORE: <0.1 KU/L (ref 0–0.34)
ALLERGEN TUNA IGE: <0.1 KU/L
ALLERGEN WALNUT TREE IGE: <0.1 KU/L (ref 0–0.34)
ALLERGEN WHEAT IGE: <0.1 KU/L
ALLERGEN WHITE MULBERRY TREE, IGE: <0.1 KU/L (ref 0–0.34)
ALLERGEN, TREE, WHITE ASH IGE: <0.1 KU/L (ref 0–0.34)
ALTERNARIA ALTERNATA: <0.1 KU/L (ref 0–0.34)
ASPERGILLUS FUMIGATUS: <0.1 KU/L (ref 0–0.34)
CAT DANDER ANTIBODY: <0.1 KU/L (ref 0–0.34)
COTTONWOOD TREE: <0.1 KU/L (ref 0–0.34)
D. FARINAE: 1.76 KU/L (ref 0–0.34)
D. PTERONYSSINUS: 2.05 KU/L (ref 0–0.34)
ELM TREE: <0.1 KU/L (ref 0–0.34)
IGE: 48 IU/ML
MAPLE/BOXELDER TREE: <0.1 KU/L (ref 0–0.34)
MOUNTAIN CEDAR TREE: <0.1 KU/L (ref 0–0.34)
MUCOR RACEMOSUS: <0.1 KU/L (ref 0–0.34)
P. NOTATUM: <0.1 KU/L (ref 0–0.34)
POTATO, IGE: <0.1 KU/L
RUSSIAN THISTLE: <0.1 KU/L (ref 0–0.34)
SHORT RAGWD(A ARTEMIS.) IGE: <0.1 KU/L (ref 0–0.34)
SHRIMP: <0.1 KU/L
TIMOTHY GRASS: <0.1 KU/L (ref 0–0.34)

## 2023-08-18 ENCOUNTER — OFFICE VISIT (OUTPATIENT)
Dept: CARDIOLOGY CLINIC | Age: 48
End: 2023-08-18
Payer: COMMERCIAL

## 2023-08-18 VITALS
BODY MASS INDEX: 27.94 KG/M2 | HEART RATE: 72 BPM | WEIGHT: 142.3 LBS | RESPIRATION RATE: 16 BRPM | SYSTOLIC BLOOD PRESSURE: 122 MMHG | DIASTOLIC BLOOD PRESSURE: 80 MMHG | HEIGHT: 60 IN

## 2023-08-18 DIAGNOSIS — R06.00 DYSPNEA, UNSPECIFIED TYPE: ICD-10-CM

## 2023-08-18 DIAGNOSIS — J45.20 MILD INTERMITTENT ASTHMA WITHOUT COMPLICATION: ICD-10-CM

## 2023-08-18 DIAGNOSIS — R53.83 OTHER FATIGUE: ICD-10-CM

## 2023-08-18 DIAGNOSIS — R07.9 CHEST PAIN, UNSPECIFIED TYPE: Primary | ICD-10-CM

## 2023-08-18 DIAGNOSIS — J20.9 BRONCHITIS WITH BRONCHOSPASM: ICD-10-CM

## 2023-08-18 DIAGNOSIS — R06.09 DOE (DYSPNEA ON EXERTION): ICD-10-CM

## 2023-08-18 PROCEDURE — G8419 CALC BMI OUT NRM PARAM NOF/U: HCPCS | Performed by: INTERNAL MEDICINE

## 2023-08-18 PROCEDURE — 1036F TOBACCO NON-USER: CPT | Performed by: INTERNAL MEDICINE

## 2023-08-18 PROCEDURE — 99214 OFFICE O/P EST MOD 30 MIN: CPT | Performed by: INTERNAL MEDICINE

## 2023-08-18 PROCEDURE — 93000 ELECTROCARDIOGRAM COMPLETE: CPT | Performed by: INTERNAL MEDICINE

## 2023-08-18 PROCEDURE — G8427 DOCREV CUR MEDS BY ELIG CLIN: HCPCS | Performed by: INTERNAL MEDICINE

## 2023-08-18 NOTE — PROGRESS NOTES
Out PATIENT New CARDIOLOGY CONSULT    Name: Minnie Dunlap    Age: 57 y.o. Date of Admission: No admission date for patient encounter. Date of Service: 8/18/2023    Reason for Consultation:   Chief Complaint   Patient presents with    New Patient     Patient complains of chest pains. Chest Pain          Referring Physician: No admitting provider for patient encounter. History of Present Illness: 32-year-old female with history of asthma who presents with complaints of intermittent chest tightness, fatigue and externalization. Exercise myocardial perfusion stress test and echocardiogram is arranged. Past Medical History:  Past Medical History:   Diagnosis Date    Asthma        Past Surgical History:  History reviewed. No pertinent surgical history. Family History:  Family History   Problem Relation Age of Onset    Cancer Mother     Diabetes Mother     Diabetes Father     Hypertension Father        Social History:  Social History     Socioeconomic History    Marital status:      Spouse name: Not on file    Number of children: Not on file    Years of education: Not on file    Highest education level: Not on file   Occupational History    Not on file   Tobacco Use    Smoking status: Never    Smokeless tobacco: Never   Vaping Use    Vaping Use: Never used   Substance and Sexual Activity    Alcohol use: Never    Drug use: Never    Sexual activity: Not on file   Other Topics Concern    Not on file   Social History Narrative    Not on file     Social Determinants of Health     Financial Resource Strain: Not on file   Food Insecurity: Not on file   Transportation Needs: Not on file   Physical Activity: Not on file   Stress: Not on file   Social Connections: Not on file   Intimate Partner Violence: Not on file   Housing Stability: Not on file       Allergies:  No Known Allergies    Home Medications:  Prior to Admission medications    Medication Sig Start Date End Date Taking?  Authorizing

## 2023-10-05 ENCOUNTER — TELEPHONE (OUTPATIENT)
Dept: CARDIOLOGY | Age: 48
End: 2023-10-05

## 2023-10-05 DIAGNOSIS — R07.9 CHEST PAIN, UNSPECIFIED TYPE: Primary | ICD-10-CM

## 2023-10-05 DIAGNOSIS — R06.09 DOE (DYSPNEA ON EXERTION): ICD-10-CM

## 2023-10-05 NOTE — TELEPHONE ENCOUNTER
Good Morning -  Patient is scheduled for a Stress test and Echo 10/11. Both requests were submitted for authorization approval.    I received notification from [employer] Keilalyric Fung) that patient is still pending and requires further information and/or P2P from provider. Please call [phone] with Tracking #s: [id number] (Stress) & [id number] (Echo)    Please advise how you wish to proceed. Thank you! BIANKA Vela

## 2023-10-06 NOTE — TELEPHONE ENCOUNTER
Good Morning -  I received notification for both tests that they were denied. If you wish to appeal or call for P2P and obtain approval-  Please call:  [phone]  Echo Tracking#:[id number]  Stress Tracking#:[id number]      If you do not plan to attempt this, please advise so I know to cancel the appointments for 10/11/23.     Thank you

## 2023-10-09 ENCOUNTER — TELEPHONE (OUTPATIENT)
Dept: CARDIOLOGY | Age: 48
End: 2023-10-09

## 2023-10-09 NOTE — TELEPHONE ENCOUNTER
Called and LM on Patient primary line. Patient testing is cancelled d/t auth denial.  Testing cancelled for 10/11 Stress and Echo    Provider notified that patient requires further info and/or P2P for approval.    If Provider completes P2P and Nigel Humphreys is approved, patient will be re-scheduled for Stress and Echo.     Albin 10/9 @ 10:30a

## 2023-10-10 NOTE — TELEPHONE ENCOUNTER
Stress test exercise only test order placed. Patient called to provide number to call to schedule appointment.